# Patient Record
Sex: FEMALE | Race: WHITE
[De-identification: names, ages, dates, MRNs, and addresses within clinical notes are randomized per-mention and may not be internally consistent; named-entity substitution may affect disease eponyms.]

---

## 2018-07-07 ENCOUNTER — HOSPITAL ENCOUNTER (OUTPATIENT)
Dept: HOSPITAL 43 - DL.MS | Age: 28
Setting detail: OBSERVATION
LOS: 1 days | Discharge: HOME | End: 2018-07-08
Attending: OBSTETRICS & GYNECOLOGY | Admitting: OBSTETRICS & GYNECOLOGY
Payer: COMMERCIAL

## 2018-07-07 DIAGNOSIS — E86.0: ICD-10-CM

## 2018-07-07 DIAGNOSIS — L55.9: ICD-10-CM

## 2018-07-07 DIAGNOSIS — Z3A.31: ICD-10-CM

## 2018-07-07 DIAGNOSIS — Z88.2: ICD-10-CM

## 2018-07-07 DIAGNOSIS — O99.713: Primary | ICD-10-CM

## 2018-07-07 DIAGNOSIS — O23.43: ICD-10-CM

## 2018-07-07 DIAGNOSIS — Z79.2: ICD-10-CM

## 2018-07-07 PROCEDURE — 96372 THER/PROPH/DIAG INJ SC/IM: CPT

## 2018-07-07 PROCEDURE — 96361 HYDRATE IV INFUSION ADD-ON: CPT

## 2018-07-07 PROCEDURE — 81001 URINALYSIS AUTO W/SCOPE: CPT

## 2018-07-07 PROCEDURE — 87086 URINE CULTURE/COLONY COUNT: CPT

## 2018-07-07 PROCEDURE — G0378 HOSPITAL OBSERVATION PER HR: HCPCS

## 2018-07-07 PROCEDURE — 96374 THER/PROPH/DIAG INJ IV PUSH: CPT

## 2018-07-07 RX ADMIN — TERBUTALINE SULFATE PRN MG: 1 INJECTION SUBCUTANEOUS at 21:11

## 2018-07-07 RX ADMIN — TERBUTALINE SULFATE PRN MG: 1 INJECTION SUBCUTANEOUS at 20:19

## 2018-07-09 NOTE — DISCH
HISTORY:  This patient is a 27-year-old female who was followed by Dr. Gaetano Abbott in [place].  Her chief complaint was sunburn to her feet and lower

abdominal discomfort.  When she was admitted for observation last night, she did

have threatened  labor.  Although she denied all urinary symptoms.  She

definitely had an abnormal urinalysis and the culture is pending at the present

time.  The patient does have high likelihood of UTI, of course.  She was begun

on Macrobid last night.  Also because of the presence of clue cells, she was

begun on Flagyl 500 mg p.o. b.i.d. for 1 week last night.  The patient has had

rather poor hydration and rather poor food intake the last day or two and has

been spending considerable time in the sun in the [place] area.  Her mother

has been with her also.  She denies any back pain or fever.  Because of her

threatened  labor and the abnormal UA, she was observed briefly over the

night last night, now this morning on 2018, she is stable and improved.

Please see my written H and P from last night on 2018, that is going to be

scanned and put in the [employer] system.

 

As mentioned above, the patient has had a very stable night.  Her cervix on

admission observation last night revealed the external os was 1 cm dilated and

the internal os was closed and 50%, presenting part was very high and

undetermined.  The patient is at approximately 31 weeks 3 days' gestation with

an BALBINA of .  She lives in the [facility] and has a future

appointment in 2 to 3 more days with Dr. Gaetano Abbott in [facility].

The patient was given oral hydration as well as IV hydration last night.  She

also was given 2 intermittent doses of terbutaline subcutaneously.  This did

seem to abolish her contractions.  Her degree of dehydration also was probably a

factor with her uterine contractions last night.

 

Her vital signs are stable today.  She is discharged home now in good condition.

We did thoroughly emphasize the importance to the patient as well as her mother

to have appropriate oral hydration in the future and to also improve her

nutritional intake.  She also was instructed to keep either us or Dr. Geatano Abbott informed in the [facility] if any recurrence of frequent false labor

that seems to lead on to any of the signs and symptoms of true labor which we

thoroughly explain to her.  She also will use either Benadryl cream or calamine

cream for her dorsum of her feet where her sunburn is.  There is no blistering

in this area.  She also has significant ankle and pedal edema.  She will elevate

her feet as often as possible and avoid excessive sodium or avoid intake of too

many things that come from a bag box or a can.

 

Other discharge medications consist of:

1. Macrobid 100 mg p.o. b.i.d. for 7 more days.

2. Flagyl 500 mg p.o. b.i.d. for 7 more days.

3. She will continue with her prenatal vitamin and all other medications that

    she was accustomed to taking antenatally.

 

All of her questions have been answered as best as possible and the patient and

her mother assured me that they will keep me or Dr. Gaetano Abbott informed and

they will try to follow our instructions.

 

FINAL DIAGNOSES:

1. Pregnancy at 31 weeks 3 days' gestation.  The patient is a 27-year-old,

     1.

2. Dehydration and poor nutrition.

3. Urinary tract infection.

4. Sun burn of dorsum of feet.

 

The patient was also given a work release to excuse her from working at her

employer's today, 2018.

 

DD:  2018 11:38:40

DT:  2018 13:11:50

[facility]

Job #:  [id number]